# Patient Record
(demographics unavailable — no encounter records)

---

## 2025-05-08 NOTE — DISCUSSION/SUMMARY
[FreeTextEntry1] : EKG:NSR,NSST abn( no change) continue zetia +Lipitor for HLD/calcium score; valsartan for HPTN